# Patient Record
Sex: FEMALE | Race: WHITE
[De-identification: names, ages, dates, MRNs, and addresses within clinical notes are randomized per-mention and may not be internally consistent; named-entity substitution may affect disease eponyms.]

---

## 2018-07-20 ENCOUNTER — HOSPITAL ENCOUNTER (OUTPATIENT)
Dept: HOSPITAL 88 - US | Age: 28
End: 2018-07-20
Attending: FAMILY MEDICINE
Payer: COMMERCIAL

## 2018-07-20 DIAGNOSIS — R10.12: Primary | ICD-10-CM

## 2018-07-20 PROCEDURE — 76700 US EXAM ABDOM COMPLETE: CPT

## 2018-07-20 NOTE — DIAGNOSTIC IMAGING REPORT
PROCEDURE:ABDOMINAL ULTRASOUND

 

COMPARISON:None.

 

INDICATIONS: LUQ PAIN

 

TECHNIQUE: Transverse and longitudinal images of the upper abdomen were 

obtained. 

 

FINDINGS:

Limited visualization due to overlying bowel. 

 

Liver: 

Size: 16 cm in the right midclavicular line, normal

Appearance: Normal echogenicity, smooth contour

Mass: No focal masses

 

Spleen: 

Size: 11.4 cm in length, normal

Echogenicity: Normal

Mass: No focal masses

 

Gallbladder: 

Stones/Sludge: None

Appearance: No wall thickening or pericholecystic fluid. 

Sonographic Roe's Sign: Negative

 

Bile Ducts: 

Intrahepatic Ducts: No dilatation

Extrahepatic Ducts: Common bile duct measures 0.3 cm, no dilatation

 

Pancreas: 

Limited visualization. The visualized portions of the pancreatic head 

appear unremarkable.

 

Right Kidney:

Size: 10.5 cm

Echogenicity: Normal

Collecting System: No hydronephrosis

Stone: None

Cyst/Mass: None

 

Left Kidney:

Size:   10.5 cm

Echogenicity: Normal

Collecting System: No hydronephrosis

Stone: None

Cyst/Mass: None

 

Vessels:

Aorta: Visualized portions are normal

Inferior Vena Cava: Visualized portions and normal

Main Portal Vein: 1  cm, normal size with hepatopedal flow. 

 

Free Fluid: 

No ascites.

 

IMPRESSION:

No etiology identified for left upper quadrant abdominal pain.  

 

Liver measuring at the upper limits of normal at 16 cm. No sonographic 

evidence of fatty liver. 

 

 

 

Dictated by:  LISA CALDERON M.D. on 7/20/2018 at 10:28     

Electronically approved by:  LISA CALDERON M.D. on 7/20/2018 at 10:28